# Patient Record
Sex: MALE | Race: WHITE | URBAN - METROPOLITAN AREA
[De-identification: names, ages, dates, MRNs, and addresses within clinical notes are randomized per-mention and may not be internally consistent; named-entity substitution may affect disease eponyms.]

---
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
## 2024-02-28 ENCOUNTER — OFFICE VISIT (OUTPATIENT)
Dept: ENDOCRINOLOGY | Age: 19
End: 2024-02-28
Payer: COMMERCIAL

## 2024-02-28 VITALS
BODY MASS INDEX: 17.33 KG/M2 | WEIGHT: 104 LBS | HEART RATE: 96 BPM | OXYGEN SATURATION: 96 % | HEIGHT: 65 IN | RESPIRATION RATE: 16 BRPM | DIASTOLIC BLOOD PRESSURE: 64 MMHG | SYSTOLIC BLOOD PRESSURE: 100 MMHG

## 2024-02-28 DIAGNOSIS — R76.8 THYROGLOBULIN ANTIBODY POSITIVE: ICD-10-CM

## 2024-02-28 DIAGNOSIS — Z83.49 FAMILY HISTORY OF THYROID DISEASE: ICD-10-CM

## 2024-02-28 DIAGNOSIS — R94.6 ABNORMAL THYROID FUNCTION TEST: Primary | ICD-10-CM

## 2024-02-28 DIAGNOSIS — R53.82 CHRONIC FATIGUE: ICD-10-CM

## 2024-02-28 DIAGNOSIS — E55.9 VITAMIN D DEFICIENCY: ICD-10-CM

## 2024-02-28 DIAGNOSIS — Z83.49 FAMILY HISTORY OF ADRENAL INSUFFICIENCY: ICD-10-CM

## 2024-02-28 PROCEDURE — 99214 OFFICE O/P EST MOD 30 MIN: CPT | Performed by: INTERNAL MEDICINE

## 2024-02-28 RX ORDER — MONTELUKAST SODIUM 10 MG/1
10 TABLET ORAL NIGHTLY
COMMUNITY

## 2024-02-28 ASSESSMENT — ENCOUNTER SYMPTOMS
TROUBLE SWALLOWING: 1
DIARRHEA: 0
ROS SKIN COMMENTS: DENIES HAIR LOSS, DRY SKIN.
CONSTIPATION: 0

## 2024-02-28 NOTE — PROGRESS NOTES
distress.  Neck:      Thyroid: No thyroid mass or thyromegaly.   Cardiovascular:      Rate and Rhythm: Normal rate and regular rhythm.   Lymphadenopathy:      Cervical: No cervical adenopathy.   Neurological:      Motor: No tremor.         Orders Placed This Encounter   Procedures    Vitamin D 25 Hydroxy     Standing Status:   Future     Standing Expiration Date:   2/28/2025    TSH with Reflex     Standing Status:   Future     Standing Expiration Date:   2/28/2026    Vitamin D 25 Hydroxy     Standing Status:   Future     Standing Expiration Date:   2/28/2026         Current Outpatient Medications   Medication Sig Dispense Refill    montelukast (SINGULAIR) 10 MG tablet Take 1 tablet by mouth nightly      vitamin D (CHOLECALCIFEROL) 47273 UNIT CAPS Take 1 capsule by mouth every 14 days 6 capsule 3    dupilumab (DUPIXENT) 300 MG/2ML SOPN injection Inject 2 mLs into the skin every 14 days      methylphenidate (CONCERTA) 18 MG extended release tablet Take 1 tablet (18 mg) by mouth daily Max Daily Amount 18 mg      sertraline (ZOLOFT) 100 MG tablet Take 1.5 tablets (150 mg) by mouth daily       No current facility-administered medications for this visit.

## 2025-02-26 ENCOUNTER — OFFICE VISIT (OUTPATIENT)
Dept: ENDOCRINOLOGY | Age: 20
End: 2025-02-26
Payer: COMMERCIAL

## 2025-02-26 VITALS
RESPIRATION RATE: 16 BRPM | WEIGHT: 139.4 LBS | HEART RATE: 93 BPM | HEIGHT: 65 IN | SYSTOLIC BLOOD PRESSURE: 115 MMHG | BODY MASS INDEX: 23.22 KG/M2 | OXYGEN SATURATION: 96 % | DIASTOLIC BLOOD PRESSURE: 65 MMHG

## 2025-02-26 DIAGNOSIS — R76.8 THYROGLOBULIN ANTIBODY POSITIVE: Primary | ICD-10-CM

## 2025-02-26 DIAGNOSIS — E55.9 VITAMIN D DEFICIENCY: ICD-10-CM

## 2025-02-26 PROBLEM — R94.6 ABNORMAL THYROID FUNCTION TEST: Status: RESOLVED | Noted: 2022-08-19 | Resolved: 2025-02-26

## 2025-02-26 PROCEDURE — 99214 OFFICE O/P EST MOD 30 MIN: CPT | Performed by: INTERNAL MEDICINE

## 2025-02-26 RX ORDER — OLANZAPINE 2.5 MG/1
2.5 TABLET, FILM COATED ORAL NIGHTLY
COMMUNITY
Start: 2025-02-11 | End: 2025-05-12

## 2025-02-26 RX ORDER — OMEPRAZOLE 20 MG/1
20 CAPSULE, DELAYED RELEASE ORAL DAILY
COMMUNITY
Start: 2024-04-16

## 2025-02-26 ASSESSMENT — ENCOUNTER SYMPTOMS
DIARRHEA: 0
CONSTIPATION: 0
NAUSEA: 1
TROUBLE SWALLOWING: 1
ROS SKIN COMMENTS: DENIES HAIR LOSS, DRY SKIN.

## 2025-02-26 NOTE — PROGRESS NOTES
Khari Calhoun MD, FACE  [facility] Endocrinology and Thyroid Nodule [facility]  [address] [place] [address], [facility] [address]  [place] [address]  Phone [phone]  Facsimile [phone]          Albert Powers is a 19 y.o. male seen 2/26/2025 for follow up of abnormal thyroid function test and vitamin D deficiency        ASSESSMENT AND PLAN:    1. 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  He does have an extensive family history of autoimmune thyroid disease.  We have discussed that a significant portion of the normal population also has positive thyroglobulin antibodies.  Thyroid peroxidase antibodies were normal and his thyroid ultrasound was normal.  He therefore has no evidence of Hashimoto's thyroiditis at this time.  He may be at increased risk for the development of Hashimoto's thyroiditis and subsequent hypothyroidism in the future given his family history.  He is currently biochemically euthyroid.    2. Vitamin D deficiency  He has been noncompliant with his vitamin D replacement and I will have him resume it as below.    - vitamin D (CHOLECALCIFEROL) 91669 UNIT CAPS; Take 1 capsule by mouth every 14 days  Dispense: 6 capsule; Refill: 3      Follow-up and Dispositions    Return in about 1 year (around 2/26/2026).         HISTORY OF PRESENT ILLNESS:    THYROID DISEASE    Presentation/Diagnosis: He has a history of elevated thyroglobulin antibodies and has previously been followed by pediatric endocrinology.  He was also evaluated by Dr. Cronin 1/2023.    Symptoms: See review of systems.     Treatment: None.    Imaging:   Thyroid ultrasound 8/19/2022: Right lobe 1.45 x 1.37 x 4.17 cm, homogeneous echotexture, no obvious nodules.  Isthmus measures 0.24 cm.  Left lobe 1.16 x 1.25 x 3.94 cm, homogeneous echotexture, no obvious nodules.    Labs:  7/23/2020: TSH 1.526, free T4 0.84.  3/16/2021: TSH 2.283, free T4 0.98, thyroglobulin antibody 8.8.  7/27/2021:  A [employer] page was sent to Blair Aguirre DO The page included the following message: [phone] FROM: ERNA BELTRÁN [employer] RE: HAJA GIBBONS RN [facility] 12T